# Patient Record
Sex: MALE | Race: OTHER | NOT HISPANIC OR LATINO
[De-identification: names, ages, dates, MRNs, and addresses within clinical notes are randomized per-mention and may not be internally consistent; named-entity substitution may affect disease eponyms.]

---

## 2022-06-20 ENCOUNTER — APPOINTMENT (OUTPATIENT)
Dept: INTERNAL MEDICINE | Facility: CLINIC | Age: 42
End: 2022-06-20
Payer: MEDICARE

## 2022-06-20 VITALS
OXYGEN SATURATION: 98 % | HEART RATE: 88 BPM | BODY MASS INDEX: 28.7 KG/M2 | HEIGHT: 67.5 IN | TEMPERATURE: 98.7 F | SYSTOLIC BLOOD PRESSURE: 128 MMHG | DIASTOLIC BLOOD PRESSURE: 88 MMHG | WEIGHT: 185 LBS

## 2022-06-20 DIAGNOSIS — J06.9 ACUTE UPPER RESPIRATORY INFECTION, UNSPECIFIED: ICD-10-CM

## 2022-06-20 PROCEDURE — 99214 OFFICE O/P EST MOD 30 MIN: CPT | Mod: 25

## 2022-06-20 PROCEDURE — 99406 BEHAV CHNG SMOKING 3-10 MIN: CPT

## 2022-06-20 NOTE — HISTORY OF PRESENT ILLNESS
[FreeTextEntry8] : Patient is 41 year male  with PMH of HTN, Hyperlipidemia, Bipolar disorder , GERD came today with c/o sinus pressure, cough and cold symptoms for about 1 wks\par As per patient he had COVID 19 test done X 3 ( t 2 times home test and 1 PCR) as per patient all of them were negative\par Came today  with c/o persistent cough and nasal congestion with greenish mucase\par Also patient states that lately his BP is uncontrolled, \par Currently on Losartan 50 mg QD \par

## 2022-08-03 ENCOUNTER — APPOINTMENT (OUTPATIENT)
Dept: INTERNAL MEDICINE | Facility: CLINIC | Age: 42
End: 2022-08-03

## 2023-01-15 ENCOUNTER — RX RENEWAL (OUTPATIENT)
Age: 43
End: 2023-01-15

## 2023-06-01 ENCOUNTER — APPOINTMENT (OUTPATIENT)
Dept: INTERNAL MEDICINE | Facility: CLINIC | Age: 43
End: 2023-06-01
Payer: MEDICARE

## 2023-06-01 VITALS
DIASTOLIC BLOOD PRESSURE: 90 MMHG | SYSTOLIC BLOOD PRESSURE: 156 MMHG | OXYGEN SATURATION: 98 % | BODY MASS INDEX: 28.39 KG/M2 | HEIGHT: 67.5 IN | HEART RATE: 101 BPM | WEIGHT: 183 LBS

## 2023-06-01 PROCEDURE — 99214 OFFICE O/P EST MOD 30 MIN: CPT

## 2023-06-01 RX ORDER — FLUTICASONE PROPIONATE 50 UG/1
50 SPRAY, METERED NASAL TWICE DAILY
Qty: 1 | Refills: 1 | Status: DISCONTINUED | COMMUNITY
Start: 2022-06-20 | End: 2023-06-01

## 2023-06-01 RX ORDER — AMOXICILLIN 875 MG/1
875 TABLET, FILM COATED ORAL
Qty: 14 | Refills: 0 | Status: DISCONTINUED | COMMUNITY
Start: 2022-06-20 | End: 2023-06-01

## 2023-06-01 NOTE — PHYSICAL EXAM
[Well-Appearing] : well-appearing [Normal Outer Ear/Nose] : the outer ears and nose were normal in appearance [Normal Oropharynx] : the oropharynx was normal [Normal TMs] : both tympanic membranes were normal [No JVD] : no jugular venous distention [No Lymphadenopathy] : no lymphadenopathy [Supple] : supple [No Respiratory Distress] : no respiratory distress  [No Accessory Muscle Use] : no accessory muscle use [Clear to Auscultation] : lungs were clear to auscultation bilaterally [Normal Rate] : normal rate  [Regular Rhythm] : with a regular rhythm [Normal S1, S2] : normal S1 and S2 [No Carotid Bruits] : no carotid bruits [No Abdominal Bruit] : a ~M bruit was not heard ~T in the abdomen [No Varicosities] : no varicosities [Soft] : abdomen soft [Non Tender] : non-tender [Non-distended] : non-distended [No Joint Swelling] : no joint swelling [No Rash] : no rash [No Skin Lesions] : no skin lesions [Coordination Grossly Intact] : coordination grossly intact [No Focal Deficits] : no focal deficits [Normal Gait] : normal gait [Speech Grossly Normal] : speech grossly normal [Memory Grossly Normal] : memory grossly normal [Alert and Oriented x3] : oriented to person, place, and time [de-identified] : Lower back pain  at tail bone area

## 2023-06-01 NOTE — REVIEW OF SYSTEMS
[Fever] : no fever [Chills] : no chills [Fatigue] : no fatigue [Discharge] : no discharge [Pain] : no pain [Earache] : no earache [Hearing Loss] : no hearing loss [Chest Pain] : no chest pain [Palpitations] : no palpitations [Claudication] : no  leg claudication [Shortness Of Breath] : no shortness of breath [Wheezing] : no wheezing [Cough] : no cough [Abdominal Pain] : no abdominal pain [Nausea] : no nausea [Constipation] : no constipation [Dysuria] : no dysuria [Incontinence] : no incontinence [Hesitancy] : no hesitancy [Joint Pain] : no joint pain [Joint Stiffness] : no joint stiffness [Muscle Pain] : no muscle pain [Itching] : no itching [Headache] : no headache [Dizziness] : no dizziness [Fainting] : no fainting [Suicidal] : not suicidal [Insomnia] : no insomnia [de-identified] : Bipolar disorder

## 2023-06-01 NOTE — HISTORY OF PRESENT ILLNESS
[FreeTextEntry1] : PAtient came for follow up visit [de-identified] : Patient is 42 year male  with PMH of HTN, Bipolar disorder, Hyperlipidemia,  , came today with c/o lower back pain\par Patient presents for follow up for his chronic medical conditions. He reports compliance with all prescribed medical therapy and dietary\par \par

## 2023-08-04 ENCOUNTER — NON-APPOINTMENT (OUTPATIENT)
Age: 43
End: 2023-08-04

## 2023-09-07 ENCOUNTER — APPOINTMENT (OUTPATIENT)
Dept: INTERNAL MEDICINE | Facility: CLINIC | Age: 43
End: 2023-09-07

## 2023-09-20 ENCOUNTER — RX RENEWAL (OUTPATIENT)
Age: 43
End: 2023-09-20

## 2023-11-28 ENCOUNTER — RX RENEWAL (OUTPATIENT)
Age: 43
End: 2023-11-28

## 2023-11-29 ENCOUNTER — NON-APPOINTMENT (OUTPATIENT)
Age: 43
End: 2023-11-29

## 2023-11-29 ENCOUNTER — APPOINTMENT (OUTPATIENT)
Dept: INTERNAL MEDICINE | Facility: CLINIC | Age: 43
End: 2023-11-29
Payer: MEDICARE

## 2023-11-29 VITALS
SYSTOLIC BLOOD PRESSURE: 128 MMHG | WEIGHT: 183 LBS | DIASTOLIC BLOOD PRESSURE: 86 MMHG | HEART RATE: 117 BPM | HEIGHT: 67.5 IN | OXYGEN SATURATION: 98 % | BODY MASS INDEX: 28.39 KG/M2

## 2023-11-29 DIAGNOSIS — M54.50 LOW BACK PAIN, UNSPECIFIED: ICD-10-CM

## 2023-11-29 DIAGNOSIS — Z00.00 ENCOUNTER FOR GENERAL ADULT MEDICAL EXAMINATION W/OUT ABNORMAL FINDINGS: ICD-10-CM

## 2023-11-29 DIAGNOSIS — F17.210 NICOTINE DEPENDENCE, CIGARETTES, UNCOMPLICATED: ICD-10-CM

## 2023-11-29 DIAGNOSIS — R00.2 PALPITATIONS: ICD-10-CM

## 2023-11-29 DIAGNOSIS — J34.89 OTHER SPECIFIED DISORDERS OF NOSE AND NASAL SINUSES: ICD-10-CM

## 2023-11-29 PROCEDURE — 99406 BEHAV CHNG SMOKING 3-10 MIN: CPT

## 2023-11-29 PROCEDURE — G0439: CPT

## 2023-11-29 PROCEDURE — 36415 COLL VENOUS BLD VENIPUNCTURE: CPT

## 2023-11-29 PROCEDURE — 93000 ELECTROCARDIOGRAM COMPLETE: CPT

## 2023-12-06 DIAGNOSIS — E55.9 VITAMIN D DEFICIENCY, UNSPECIFIED: ICD-10-CM

## 2023-12-06 LAB
25(OH)D3 SERPL-MCNC: 17.4 NG/ML
ALBUMIN SERPL ELPH-MCNC: 4.2 G/DL
ALP BLD-CCNC: 109 U/L
ALT SERPL-CCNC: 30 U/L
ANION GAP SERPL CALC-SCNC: 13 MMOL/L
AST SERPL-CCNC: 18 U/L
BILIRUB SERPL-MCNC: 0.2 MG/DL
BUN SERPL-MCNC: 11 MG/DL
CALCIUM SERPL-MCNC: 9.5 MG/DL
CHLORIDE SERPL-SCNC: 102 MMOL/L
CHOLEST SERPL-MCNC: 181 MG/DL
CO2 SERPL-SCNC: 24 MMOL/L
CREAT SERPL-MCNC: 1.07 MG/DL
EGFR: 89 ML/MIN/1.73M2
GLUCOSE SERPL-MCNC: 138 MG/DL
HCT VFR BLD CALC: 46.5 %
HDLC SERPL-MCNC: 29 MG/DL
HGB BLD-MCNC: 15 G/DL
LDLC SERPL CALC-MCNC: 98 MG/DL
MCHC RBC-ENTMCNC: 29.5 PG
MCHC RBC-ENTMCNC: 32.3 GM/DL
MCV RBC AUTO: 91.4 FL
NONHDLC SERPL-MCNC: 153 MG/DL
PLATELET # BLD AUTO: 256 K/UL
POTASSIUM SERPL-SCNC: 4.2 MMOL/L
PROT SERPL-MCNC: 6.9 G/DL
RBC # BLD: 5.09 M/UL
RBC # FLD: 14 %
SODIUM SERPL-SCNC: 139 MMOL/L
TRIGL SERPL-MCNC: 326 MG/DL
TSH SERPL-ACNC: 1.45 UIU/ML
WBC # FLD AUTO: 10.33 K/UL

## 2023-12-22 ENCOUNTER — NON-APPOINTMENT (OUTPATIENT)
Age: 43
End: 2023-12-22

## 2023-12-27 ENCOUNTER — NON-APPOINTMENT (OUTPATIENT)
Age: 43
End: 2023-12-27

## 2024-01-18 ENCOUNTER — RX RENEWAL (OUTPATIENT)
Age: 44
End: 2024-01-18

## 2024-01-18 RX ORDER — FAMOTIDINE 40 MG/1
40 TABLET, FILM COATED ORAL
Qty: 180 | Refills: 1 | Status: ACTIVE | COMMUNITY
Start: 2022-06-20 | End: 1900-01-01

## 2024-05-17 ENCOUNTER — RX RENEWAL (OUTPATIENT)
Age: 44
End: 2024-05-17

## 2024-05-17 RX ORDER — LOSARTAN POTASSIUM 100 MG/1
100 TABLET, FILM COATED ORAL
Qty: 90 | Refills: 0 | Status: ACTIVE | COMMUNITY
Start: 2022-06-20 | End: 1900-01-01

## 2024-05-20 ENCOUNTER — APPOINTMENT (OUTPATIENT)
Dept: INTERNAL MEDICINE | Facility: CLINIC | Age: 44
End: 2024-05-20

## 2024-05-29 ENCOUNTER — RX RENEWAL (OUTPATIENT)
Age: 44
End: 2024-05-29

## 2024-05-29 RX ORDER — SIMVASTATIN 10 MG/1
10 TABLET, FILM COATED ORAL
Qty: 90 | Refills: 1 | Status: ACTIVE | COMMUNITY
Start: 2022-06-20 | End: 1900-01-01

## 2024-05-30 ENCOUNTER — APPOINTMENT (OUTPATIENT)
Dept: INTERNAL MEDICINE | Facility: CLINIC | Age: 44
End: 2024-05-30
Payer: MEDICARE

## 2024-05-30 VITALS
TEMPERATURE: 98 F | OXYGEN SATURATION: 97 % | SYSTOLIC BLOOD PRESSURE: 116 MMHG | BODY MASS INDEX: 28.23 KG/M2 | HEART RATE: 82 BPM | HEIGHT: 67.5 IN | WEIGHT: 182 LBS | DIASTOLIC BLOOD PRESSURE: 73 MMHG

## 2024-05-30 DIAGNOSIS — K21.9 GASTRO-ESOPHAGEAL REFLUX DISEASE W/OUT ESOPHAGITIS: ICD-10-CM

## 2024-05-30 DIAGNOSIS — M54.50 LOW BACK PAIN, UNSPECIFIED: ICD-10-CM

## 2024-05-30 DIAGNOSIS — T14.8XXA OTHER INJURY OF UNSPECIFIED BODY REGION, INITIAL ENCOUNTER: ICD-10-CM

## 2024-05-30 DIAGNOSIS — F31.9 BIPOLAR DISORDER, UNSPECIFIED: ICD-10-CM

## 2024-05-30 DIAGNOSIS — E78.2 MIXED HYPERLIPIDEMIA: ICD-10-CM

## 2024-05-30 DIAGNOSIS — R73.02 IMPAIRED GLUCOSE TOLERANCE (ORAL): ICD-10-CM

## 2024-05-30 DIAGNOSIS — I10 ESSENTIAL (PRIMARY) HYPERTENSION: ICD-10-CM

## 2024-05-30 PROCEDURE — 99214 OFFICE O/P EST MOD 30 MIN: CPT

## 2024-05-30 PROCEDURE — 36415 COLL VENOUS BLD VENIPUNCTURE: CPT

## 2024-05-30 PROCEDURE — G2211 COMPLEX E/M VISIT ADD ON: CPT

## 2024-05-30 RX ORDER — ERGOCALCIFEROL 1.25 MG/1
1.25 MG CAPSULE ORAL
Qty: 16 | Refills: 1 | Status: ACTIVE | COMMUNITY
Start: 2023-12-06 | End: 1900-01-01

## 2024-05-30 NOTE — PHYSICAL EXAM
[Well-Appearing] : well-appearing [Normal Outer Ear/Nose] : the outer ears and nose were normal in appearance [Normal Oropharynx] : the oropharynx was normal [Normal TMs] : both tympanic membranes were normal [No JVD] : no jugular venous distention [No Lymphadenopathy] : no lymphadenopathy [Supple] : supple [No Respiratory Distress] : no respiratory distress  [No Accessory Muscle Use] : no accessory muscle use [Clear to Auscultation] : lungs were clear to auscultation bilaterally [Normal Rate] : normal rate  [Regular Rhythm] : with a regular rhythm [Normal S1, S2] : normal S1 and S2 [No Carotid Bruits] : no carotid bruits [No Abdominal Bruit] : a ~M bruit was not heard ~T in the abdomen [No Varicosities] : no varicosities [Soft] : abdomen soft [Non Tender] : non-tender [Non-distended] : non-distended [No Joint Swelling] : no joint swelling [No Rash] : no rash [No Skin Lesions] : no skin lesions [Coordination Grossly Intact] : coordination grossly intact [No Focal Deficits] : no focal deficits [Normal Gait] : normal gait [Speech Grossly Normal] : speech grossly normal [Memory Grossly Normal] : memory grossly normal [Alert and Oriented x3] : oriented to person, place, and time [de-identified] : Lower back pain  at tail bone area

## 2024-05-30 NOTE — REVIEW OF SYSTEMS
[Fever] : no fever [Chills] : no chills [Fatigue] : no fatigue [Discharge] : no discharge [Pain] : no pain [Earache] : no earache [Hearing Loss] : no hearing loss [Chest Pain] : no chest pain [Palpitations] : no palpitations [Claudication] : no  leg claudication [Shortness Of Breath] : no shortness of breath [Wheezing] : no wheezing [Cough] : no cough [Abdominal Pain] : no abdominal pain [Nausea] : no nausea [Constipation] : no constipation [Dysuria] : no dysuria [Incontinence] : no incontinence [Hesitancy] : no hesitancy [Joint Pain] : no joint pain [Joint Stiffness] : no joint stiffness [Muscle Pain] : no muscle pain [Itching] : no itching [Headache] : no headache [Dizziness] : no dizziness [Fainting] : no fainting [Suicidal] : not suicidal [Insomnia] : no insomnia [de-identified] : Bipolar disorder
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
## 2024-05-31 LAB
ALBUMIN SERPL ELPH-MCNC: 4.3 G/DL
ALP BLD-CCNC: 105 U/L
ALT SERPL-CCNC: 29 U/L
ANION GAP SERPL CALC-SCNC: 13 MMOL/L
AST SERPL-CCNC: 18 U/L
BILIRUB SERPL-MCNC: 0.2 MG/DL
BUN SERPL-MCNC: 14 MG/DL
CALCIUM SERPL-MCNC: 9.6 MG/DL
CHLORIDE SERPL-SCNC: 105 MMOL/L
CHOLEST SERPL-MCNC: 171 MG/DL
CO2 SERPL-SCNC: 20 MMOL/L
CREAT SERPL-MCNC: 0.92 MG/DL
EGFR: 107 ML/MIN/1.73M2
ESTIMATED AVERAGE GLUCOSE: 131 MG/DL
GLUCOSE SERPL-MCNC: 143 MG/DL
HBA1C MFR BLD HPLC: 6.2 %
HCT VFR BLD CALC: 45.6 %
HDLC SERPL-MCNC: 35 MG/DL
HGB BLD-MCNC: 15.2 G/DL
LDLC SERPL CALC-MCNC: 108 MG/DL
MCHC RBC-ENTMCNC: 29.5 PG
MCHC RBC-ENTMCNC: 33.3 GM/DL
MCV RBC AUTO: 88.5 FL
NONHDLC SERPL-MCNC: 136 MG/DL
PLATELET # BLD AUTO: 258 K/UL
POTASSIUM SERPL-SCNC: 4.6 MMOL/L
PROT SERPL-MCNC: 7.2 G/DL
RBC # BLD: 5.15 M/UL
RBC # FLD: 14.1 %
SODIUM SERPL-SCNC: 139 MMOL/L
TRIGL SERPL-MCNC: 156 MG/DL
WBC # FLD AUTO: 10.13 K/UL

## 2024-08-12 ENCOUNTER — RX RENEWAL (OUTPATIENT)
Age: 44
End: 2024-08-12

## 2024-08-30 ENCOUNTER — EMERGENCY (EMERGENCY)
Facility: HOSPITAL | Age: 44
LOS: 1 days | Discharge: HOME | End: 2024-08-30
Attending: STUDENT IN AN ORGANIZED HEALTH CARE EDUCATION/TRAINING PROGRAM
Payer: MEDICARE

## 2024-08-30 VITALS
TEMPERATURE: 98 F | DIASTOLIC BLOOD PRESSURE: 84 MMHG | RESPIRATION RATE: 19 BRPM | HEART RATE: 81 BPM | OXYGEN SATURATION: 96 % | SYSTOLIC BLOOD PRESSURE: 151 MMHG

## 2024-08-30 VITALS
RESPIRATION RATE: 18 BRPM | HEART RATE: 96 BPM | TEMPERATURE: 98 F | SYSTOLIC BLOOD PRESSURE: 143 MMHG | HEIGHT: 67 IN | WEIGHT: 171.96 LBS | DIASTOLIC BLOOD PRESSURE: 89 MMHG | OXYGEN SATURATION: 96 %

## 2024-08-30 LAB
ALBUMIN SERPL ELPH-MCNC: 4.3 G/DL — SIGNIFICANT CHANGE UP (ref 3.3–5)
ALP SERPL-CCNC: 108 U/L — SIGNIFICANT CHANGE UP (ref 40–120)
ALT FLD-CCNC: 18 U/L — SIGNIFICANT CHANGE UP (ref 10–45)
ANION GAP SERPL CALC-SCNC: 13 MMOL/L — SIGNIFICANT CHANGE UP (ref 5–17)
AST SERPL-CCNC: 24 U/L — SIGNIFICANT CHANGE UP (ref 10–40)
BASOPHILS # BLD AUTO: 0.03 K/UL — SIGNIFICANT CHANGE UP (ref 0–0.2)
BASOPHILS NFR BLD AUTO: 0.2 % — SIGNIFICANT CHANGE UP (ref 0–2)
BILIRUB SERPL-MCNC: 0.5 MG/DL — SIGNIFICANT CHANGE UP (ref 0.2–1.2)
BUN SERPL-MCNC: 15 MG/DL — SIGNIFICANT CHANGE UP (ref 7–23)
CALCIUM SERPL-MCNC: 9.8 MG/DL — SIGNIFICANT CHANGE UP (ref 8.4–10.5)
CHLORIDE SERPL-SCNC: 105 MMOL/L — SIGNIFICANT CHANGE UP (ref 96–108)
CO2 SERPL-SCNC: 22 MMOL/L — SIGNIFICANT CHANGE UP (ref 22–31)
CREAT SERPL-MCNC: 0.92 MG/DL — SIGNIFICANT CHANGE UP (ref 0.5–1.3)
D DIMER BLD IA.RAPID-MCNC: 152 NG/ML DDU — SIGNIFICANT CHANGE UP
EGFR: 107 ML/MIN/1.73M2 — SIGNIFICANT CHANGE UP
EOSINOPHIL # BLD AUTO: 0.01 K/UL — SIGNIFICANT CHANGE UP (ref 0–0.5)
EOSINOPHIL NFR BLD AUTO: 0.1 % — SIGNIFICANT CHANGE UP (ref 0–6)
GLUCOSE SERPL-MCNC: 122 MG/DL — HIGH (ref 70–99)
HCT VFR BLD CALC: 43.4 % — SIGNIFICANT CHANGE UP (ref 39–50)
HGB BLD-MCNC: 14.5 G/DL — SIGNIFICANT CHANGE UP (ref 13–17)
IMM GRANULOCYTES NFR BLD AUTO: 0.4 % — SIGNIFICANT CHANGE UP (ref 0–0.9)
LYMPHOCYTES # BLD AUTO: 13.7 % — SIGNIFICANT CHANGE UP (ref 13–44)
LYMPHOCYTES # BLD AUTO: 2.23 K/UL — SIGNIFICANT CHANGE UP (ref 1–3.3)
MCHC RBC-ENTMCNC: 29.9 PG — SIGNIFICANT CHANGE UP (ref 27–34)
MCHC RBC-ENTMCNC: 33.4 GM/DL — SIGNIFICANT CHANGE UP (ref 32–36)
MCV RBC AUTO: 89.5 FL — SIGNIFICANT CHANGE UP (ref 80–100)
MONOCYTES # BLD AUTO: 0.93 K/UL — HIGH (ref 0–0.9)
MONOCYTES NFR BLD AUTO: 5.7 % — SIGNIFICANT CHANGE UP (ref 2–14)
NEUTROPHILS # BLD AUTO: 13.03 K/UL — HIGH (ref 1.8–7.4)
NEUTROPHILS NFR BLD AUTO: 79.9 % — HIGH (ref 43–77)
NRBC # BLD: 0 /100 WBCS — SIGNIFICANT CHANGE UP (ref 0–0)
NT-PROBNP SERPL-SCNC: <36 PG/ML — SIGNIFICANT CHANGE UP (ref 0–300)
PLATELET # BLD AUTO: 251 K/UL — SIGNIFICANT CHANGE UP (ref 150–400)
POTASSIUM SERPL-MCNC: 4 MMOL/L — SIGNIFICANT CHANGE UP (ref 3.5–5.3)
POTASSIUM SERPL-SCNC: 4 MMOL/L — SIGNIFICANT CHANGE UP (ref 3.5–5.3)
PROT SERPL-MCNC: 7.6 G/DL — SIGNIFICANT CHANGE UP (ref 6–8.3)
RBC # BLD: 4.85 M/UL — SIGNIFICANT CHANGE UP (ref 4.2–5.8)
RBC # FLD: 14.4 % — SIGNIFICANT CHANGE UP (ref 10.3–14.5)
SODIUM SERPL-SCNC: 140 MMOL/L — SIGNIFICANT CHANGE UP (ref 135–145)
TROPONIN T, HIGH SENSITIVITY RESULT: <6 NG/L — SIGNIFICANT CHANGE UP (ref 0–51)
WBC # BLD: 16.29 K/UL — HIGH (ref 3.8–10.5)
WBC # FLD AUTO: 16.29 K/UL — HIGH (ref 3.8–10.5)

## 2024-08-30 PROCEDURE — 93005 ELECTROCARDIOGRAM TRACING: CPT

## 2024-08-30 PROCEDURE — 36000 PLACE NEEDLE IN VEIN: CPT

## 2024-08-30 PROCEDURE — 94640 AIRWAY INHALATION TREATMENT: CPT

## 2024-08-30 PROCEDURE — 84484 ASSAY OF TROPONIN QUANT: CPT

## 2024-08-30 PROCEDURE — 83880 ASSAY OF NATRIURETIC PEPTIDE: CPT

## 2024-08-30 PROCEDURE — 85379 FIBRIN DEGRADATION QUANT: CPT

## 2024-08-30 PROCEDURE — 99285 EMERGENCY DEPT VISIT HI MDM: CPT | Mod: FS

## 2024-08-30 PROCEDURE — 71046 X-RAY EXAM CHEST 2 VIEWS: CPT | Mod: 26

## 2024-08-30 PROCEDURE — 71046 X-RAY EXAM CHEST 2 VIEWS: CPT

## 2024-08-30 PROCEDURE — 99285 EMERGENCY DEPT VISIT HI MDM: CPT | Mod: 25

## 2024-08-30 PROCEDURE — 85025 COMPLETE CBC W/AUTO DIFF WBC: CPT

## 2024-08-30 PROCEDURE — 80053 COMPREHEN METABOLIC PANEL: CPT

## 2024-08-30 RX ORDER — IPRATROPIUM BROMIDE AND ALBUTEROL SULFATE .5; 3 MG/3ML; MG/3ML
3 SOLUTION RESPIRATORY (INHALATION) ONCE
Refills: 0 | Status: COMPLETED | OUTPATIENT
Start: 2024-08-30 | End: 2024-08-30

## 2024-08-30 RX ADMIN — IPRATROPIUM BROMIDE AND ALBUTEROL SULFATE 3 MILLILITER(S): .5; 3 SOLUTION RESPIRATORY (INHALATION) at 09:58

## 2024-08-30 NOTE — ED PROVIDER NOTE - NSFOLLOWUPCLINICS_GEN_ALL_ED_FT
[facility] Pulmonolgy and Sleep Medicine  Pulmonology  [address] [place] [address], [place] [address]  Phone: [phone]  Fax:

## 2024-08-30 NOTE — ED PROVIDER NOTE - OBJECTIVE STATEMENT
43-year-old male history bipolar disorder not currently on medications, daily smoker presents with worsening cough that has been present for over 1 month worsening last 2 weeks with reported chest pressure on "both sides of my lungs" worse after smoking, worse with deep inspiration.  Denies exertional chest pain, radiating chest pain or associated nausea/vomiting/diaphoresis.  Denies extremity swelling.  Denies reported fever, chills, hemoptysis, abdominal pain, vomiting

## 2024-08-30 NOTE — ED PROVIDER NOTE - NSFOLLOWUPINSTRUCTIONS_ED_ALL_ED_FT
Please follow up with your primary care doctor in 1-3 days.  Follow up with a cardiologist - see contact info     *Bring all printed lab/test results to your appointment(s).*    We recommend quitting smoking - read printed material on smoking cessation.    Daily humidifier treatments and oral hydration will help expectorate (bring up) mucous.  Nebulizer treatments as needed for cough/wheezing - (Please read all medication information/instructions).     Return for worsening shortness of breath, chest pain, dizziness, loss of consciousness, or any other concerns. Please follow up with your primary care doctor in 1-3 days.  Follow up with a pulmonologist - see contact info.    *Bring all printed lab/test results to your appointment(s).*    We recommend quitting smoking - read printed material on smoking cessation.    Daily humidifier treatments and oral hydration will help expectorate (bring up) mucous.  Albuterol inhaler as needed for cough/wheezing - (Please read all medication information/instructions).   Please discuss with your PMD before taking any cold/flu medications that may increase your blood pressure while smoking.    Return for worsening shortness of breath, chest pain, dizziness, loss of consciousness, fever >100.4F, chills, dark/bloody sputum when coughing, or any other concerns.

## 2024-08-30 NOTE — ED ADULT NURSE NOTE - NSFALLUNIVINTERV_ED_ALL_ED
Bed/Stretcher in lowest position, wheels locked, appropriate side rails in place/Call bell, personal items and telephone in reach/Instruct patient to call for assistance before getting out of bed/chair/stretcher/Non-slip footwear applied when patient is off stretcher/[place] to call system/Physically safe environment - no spills, clutter or unnecessary equipment/Purposeful proactive rounding/Room/bathroom lighting operational, light cord in reach

## 2024-08-30 NOTE — ED PROVIDER NOTE - MUSCULOSKELETAL, MLM
Spine appears normal, range of motion is not limited, no muscle or joint tenderness; no extremity swelling

## 2024-08-30 NOTE — ED PROVIDER NOTE - ATTENDING APP SHARED VISIT CONTRIBUTION OF CARE
Attending Zohreh: I performed a history and physical exam of the patient and discussed their management with the STEVIE. I have reviewed the STEVIE note and agree with the documented findings and plan of care, except as noted. This was a shared visit with an STEVIE. I reviewed and verified the documentation and independently performed my own history/exam/medical decision making. My medical decision making and observations are found below. Please refer to any progress notes for updates on clinical course. My notes supersedes the above STEVIE note in case of discrepancy    MDM:  42 y/o M w/ PMH o bipolar disorder not currently on medications, daily smoker presents w/ cough. Reports worsening cough that has been present for over 1 month, worsening last 2 weeks with reported chest pressure on "both sides of my lungs" worse after smoking, worse with deep inspiration.  Denies exertional chest pain, radiating chest pain or associated nausea/vomiting/diaphoresis.  Denies extremity swelling.  Denies prior PE/DVT. Denies reported fever, chills, hemoptysis, abdominal pain, vomiting.    Gen: NAD, AOx3, able to make needs known, non-toxic  Head: NCAT  HEENT: EOMI, oral mucosa moist, normal conjunctiva  Lung: no respiratory distress, CTAB, no wheezes/rhonchi/rales B/L, speaking in full sentences  CV: Tachycardic, no murmurs  Abd: non distended, soft, nontender, no guarding, no CVA tenderness  MSK: no visible deformities  Neuro: Appears non focal  Skin: Warm, well perfused  Psych: normal affect     Pt overall no acute distress. Will eval for PNA. Do not suspect PTX. Screen for PE w/ d-dimer. Doubtful ACS/CHF. Unlikely Asthma/COPD given no prior dx of and no wheezing. Will treat symptomatically. Plan for labs, imaging, EKG, meds. Will reassess the need for additional interventions as clinically warranted. Refer to any progress notes for updates on clinical course and as a continuation of this MDM.     I, Dr. Virgil Bruner, independently interpreted the EKG which showed sinus tachycardia at a rate of 101.

## 2024-08-30 NOTE — ED PROVIDER NOTE - PROGRESS NOTE DETAILS
Lab work revealing for leukocytosis of 16, remaining labs nonactionable.  Two-view chest x-ray with negative for acute infectious infiltrate.  Discussed all results with patient, reassessed reports feeling improved lungs clear without wheezing rales or rhonchi.  Will treat for bronchitis with albuterol inhaler given patient is daily smoker.  Struck the patient on smoking cessation. Will dc with follow up. Discussed plan and return precautions with patient who understands and agrees. All questions answered. - Rojas Ma PA-C Lab work revealing for leukocytosis of 16, remaining labs nonactionable.  Two-view chest x-ray with negative for acute infectious infiltrate.  Discussed all results with patient, reassessed reports feeling improved lungs clear without wheezing rales or rhonchi.  Will treat for bronchitis with albuterol inhaler given patient is daily smoker.  Instructed the patient on smoking cessation. Will dc with follow up. Discussed plan and return precautions with patient who understands and agrees. All questions answered. - Rojas Ma PA-C

## 2024-08-30 NOTE — ED PROVIDER NOTE - CLINICAL SUMMARY MEDICAL DECISION MAKING FREE TEXT BOX
Female dilution smoker presents with several weeks of cough worsening in the last week with associated chest pressure worse when smoking and with deep inspiration.  No hemoptysis or systemic symptoms.  No recent travel.  No extremity swelling.  Initial set of vitals mildly tachycardic in the low 100s otherwise nonactionable.  On exam mildly tachycardic with clear lungs differentials acute bronchitis, pneumonia, ACS, PE, pleurisy.  Will obtain 2 view chest x-ray, send labs including troponin and D-dimer to screen for PE given tachycardia and smoker with pleuritic chest pain, nebs, reassess - Rojas Ma PA-C 44 y/o M smoker presents with several weeks of cough worsening in the last week with associated chest pressure worse when smoking and with deep inspiration.  No hemoptysis or systemic symptoms.  No recent travel.  No extremity swelling.  Initial set of vitals mildly tachycardic in the low 100s otherwise nonactionable.  On exam mildly tachycardic with clear lungs differentials acute bronchitis, pneumonia, ACS, PE, pleurisy.  Will obtain 2 view chest x-ray, send labs including troponin and D-dimer to screen for PE given tachycardia and smoker with pleuritic chest pain, nebs, reassess - Rojas Ma PA-C    Attending Zohreh: See attending attestation.

## 2024-08-30 NOTE — ED ADULT NURSE NOTE - OBJECTIVE STATEMENT
patient received alert & oriented x4, ambulatory. Complaining of chest heaviness for 2 weeks & increasing for the past 5-6 days. (+) dry cough. Admitted "I am a chain smoker for 27 years".  Denies sob, nausea or fever. Hx HTN, high cholesterol, Acid reflux, on losartan, simvastatin & pepcid & bipolar (no meds).

## 2024-08-30 NOTE — ED PROVIDER NOTE - PATIENT PORTAL LINK FT
You can access the FollowMyHealth Patient Portal offered by [facility] by registering at the following website: [email or web address]. By joining [facility]’s FollowMyHealth portal, you will also be able to view your health information using other applications (apps) compatible with our system.

## 2024-09-05 ENCOUNTER — APPOINTMENT (OUTPATIENT)
Dept: INTERNAL MEDICINE | Facility: CLINIC | Age: 44
End: 2024-09-05

## 2024-09-19 ENCOUNTER — APPOINTMENT (OUTPATIENT)
Dept: INTERNAL MEDICINE | Facility: CLINIC | Age: 44
End: 2024-09-19

## 2024-09-23 ENCOUNTER — APPOINTMENT (OUTPATIENT)
Dept: INTERNAL MEDICINE | Facility: CLINIC | Age: 44
End: 2024-09-23

## 2024-09-24 ENCOUNTER — EMERGENCY (EMERGENCY)
Facility: HOSPITAL | Age: 44
LOS: 1 days | Discharge: PSYCHIATRIC OR SUBSTANCE USE FACILITY | End: 2024-09-24
Attending: STUDENT IN AN ORGANIZED HEALTH CARE EDUCATION/TRAINING PROGRAM
Payer: MEDICARE

## 2024-09-24 VITALS
RESPIRATION RATE: 18 BRPM | HEIGHT: 67 IN | SYSTOLIC BLOOD PRESSURE: 152 MMHG | DIASTOLIC BLOOD PRESSURE: 99 MMHG | WEIGHT: 169.98 LBS | HEART RATE: 123 BPM | OXYGEN SATURATION: 96 %

## 2024-09-24 LAB
ALBUMIN SERPL ELPH-MCNC: 4.2 G/DL — SIGNIFICANT CHANGE UP (ref 3.3–5)
ALP SERPL-CCNC: 122 U/L — HIGH (ref 40–120)
ALT FLD-CCNC: 21 U/L — SIGNIFICANT CHANGE UP (ref 10–45)
ANION GAP SERPL CALC-SCNC: 15 MMOL/L — SIGNIFICANT CHANGE UP (ref 5–17)
APAP SERPL-MCNC: <15 UG/ML — SIGNIFICANT CHANGE UP (ref 10–30)
AST SERPL-CCNC: 21 U/L — SIGNIFICANT CHANGE UP (ref 10–40)
BASOPHILS # BLD AUTO: 0.02 K/UL — SIGNIFICANT CHANGE UP (ref 0–0.2)
BASOPHILS NFR BLD AUTO: 0.1 % — SIGNIFICANT CHANGE UP (ref 0–2)
BILIRUB SERPL-MCNC: 0.4 MG/DL — SIGNIFICANT CHANGE UP (ref 0.2–1.2)
BUN SERPL-MCNC: 11 MG/DL — SIGNIFICANT CHANGE UP (ref 7–23)
CALCIUM SERPL-MCNC: 10 MG/DL — SIGNIFICANT CHANGE UP (ref 8.4–10.5)
CHLORIDE SERPL-SCNC: 101 MMOL/L — SIGNIFICANT CHANGE UP (ref 96–108)
CO2 SERPL-SCNC: 21 MMOL/L — LOW (ref 22–31)
CREAT SERPL-MCNC: 0.96 MG/DL — SIGNIFICANT CHANGE UP (ref 0.5–1.3)
EGFR: 101 ML/MIN/1.73M2 — SIGNIFICANT CHANGE UP
EOSINOPHIL # BLD AUTO: 0.06 K/UL — SIGNIFICANT CHANGE UP (ref 0–0.5)
EOSINOPHIL NFR BLD AUTO: 0.4 % — SIGNIFICANT CHANGE UP (ref 0–6)
ETHANOL SERPL-MCNC: <10 MG/DL — SIGNIFICANT CHANGE UP (ref 0–10)
FLUAV AG NPH QL: SIGNIFICANT CHANGE UP
FLUBV AG NPH QL: SIGNIFICANT CHANGE UP
GLUCOSE SERPL-MCNC: 127 MG/DL — HIGH (ref 70–99)
HCT VFR BLD CALC: 46.1 % — SIGNIFICANT CHANGE UP (ref 39–50)
HGB BLD-MCNC: 15.4 G/DL — SIGNIFICANT CHANGE UP (ref 13–17)
IMM GRANULOCYTES NFR BLD AUTO: 0.4 % — SIGNIFICANT CHANGE UP (ref 0–0.9)
LITHIUM SERPL-MCNC: <.2 MMOL/L — LOW (ref 0.6–1.2)
LYMPHOCYTES # BLD AUTO: 16.5 % — SIGNIFICANT CHANGE UP (ref 13–44)
LYMPHOCYTES # BLD AUTO: 2.29 K/UL — SIGNIFICANT CHANGE UP (ref 1–3.3)
MCHC RBC-ENTMCNC: 29.6 PG — SIGNIFICANT CHANGE UP (ref 27–34)
MCHC RBC-ENTMCNC: 33.4 GM/DL — SIGNIFICANT CHANGE UP (ref 32–36)
MCV RBC AUTO: 88.5 FL — SIGNIFICANT CHANGE UP (ref 80–100)
MONOCYTES # BLD AUTO: 0.72 K/UL — SIGNIFICANT CHANGE UP (ref 0–0.9)
MONOCYTES NFR BLD AUTO: 5.2 % — SIGNIFICANT CHANGE UP (ref 2–14)
NEUTROPHILS # BLD AUTO: 10.73 K/UL — HIGH (ref 1.8–7.4)
NEUTROPHILS NFR BLD AUTO: 77.4 % — HIGH (ref 43–77)
NRBC # BLD: 0 /100 WBCS — SIGNIFICANT CHANGE UP (ref 0–0)
PLATELET # BLD AUTO: 281 K/UL — SIGNIFICANT CHANGE UP (ref 150–400)
POTASSIUM SERPL-MCNC: 4.2 MMOL/L — SIGNIFICANT CHANGE UP (ref 3.5–5.3)
POTASSIUM SERPL-SCNC: 4.2 MMOL/L — SIGNIFICANT CHANGE UP (ref 3.5–5.3)
PROT SERPL-MCNC: 7.8 G/DL — SIGNIFICANT CHANGE UP (ref 6–8.3)
RBC # BLD: 5.21 M/UL — SIGNIFICANT CHANGE UP (ref 4.2–5.8)
RBC # FLD: 13.6 % — SIGNIFICANT CHANGE UP (ref 10.3–14.5)
RSV RNA NPH QL NAA+NON-PROBE: SIGNIFICANT CHANGE UP
SALICYLATES SERPL-MCNC: <2 MG/DL — LOW (ref 15–30)
SARS-COV-2 RNA SPEC QL NAA+PROBE: SIGNIFICANT CHANGE UP
SODIUM SERPL-SCNC: 137 MMOL/L — SIGNIFICANT CHANGE UP (ref 135–145)
WBC # BLD: 13.88 K/UL — HIGH (ref 3.8–10.5)
WBC # FLD AUTO: 13.88 K/UL — HIGH (ref 3.8–10.5)

## 2024-09-24 PROCEDURE — 99285 EMERGENCY DEPT VISIT HI MDM: CPT | Mod: GC

## 2024-09-24 PROCEDURE — 71045 X-RAY EXAM CHEST 1 VIEW: CPT | Mod: 26

## 2024-09-24 RX ORDER — ONDANSETRON 2 MG/ML
4 INJECTION, SOLUTION INTRAMUSCULAR; INTRAVENOUS ONCE
Refills: 0 | Status: COMPLETED | OUTPATIENT
Start: 2024-09-24 | End: 2024-09-24

## 2024-09-24 RX ORDER — LORAZEPAM 4 MG/ML
1 INJECTION INTRAMUSCULAR; INTRAVENOUS ONCE
Refills: 0 | Status: DISCONTINUED | OUTPATIENT
Start: 2024-09-24 | End: 2024-09-24

## 2024-09-24 RX ADMIN — ONDANSETRON 4 MILLIGRAM(S): 2 INJECTION, SOLUTION INTRAMUSCULAR; INTRAVENOUS at 21:26

## 2024-09-24 RX ADMIN — Medication 1 PATCH: at 21:28

## 2024-09-24 RX ADMIN — LORAZEPAM 1 MILLIGRAM(S): 4 INJECTION INTRAMUSCULAR; INTRAVENOUS at 21:26

## 2024-09-24 NOTE — ED PROVIDER NOTE - CARE PLAN
Principal Discharge DX:	Bipolar affective psychosis  Secondary Diagnosis:	Status post tooth extraction  Secondary Diagnosis:	Maxillary fracture   1

## 2024-09-24 NOTE — ED PROVIDER NOTE - NSRISKSEXPLAINEDTO_ED_A_ED
Telephone Encounter by Tracey Martinez at 05/15/18 03:03 PM     Author:  Tracey Martinez Service:  (none) Author Type:  Patient      Filed:  05/15/18 03:06 PM Encounter Date:  5/15/2018 Status:  Signed     :  Tracey Martinez (Patient )              TOYA MOREL    Patient Age: 56 year old   Refill request by:[SA1.1T] Phone.  Caller informed to check with the pharmacy later for their refill.  If problems arise, we will contact patient.[SA1.1M]  Refill to be:[SA1.1T] Phoned to[SA1.1M]   Pharmacy     [address] [id number]    Phone: [phone][SA1.2T]          Medication requested to be refilled:[SA1.1T]   Requested Prescriptions     Pending Prescriptions Disp Refills   • lisinopril (PRINIVIL,ZESTRIL) 40 MG tablet 90 Tab 0     Sig: TAKE 1 TAB BY MOUTH DAILY.   • Triamterene-HCTZ (MAXZIDE-25) 37.5-25 MG per tablet 90 Tab 0     Sig: Take 1 Tab by mouth daily.   • amlodipine (NORVASC) 5 MG tablet 90 Tab 0     Sig: Take 1 Tab by mouth daily.   • fenofibrate (TRICOR) 145 MG tablet 90 Tab 0     Sig: TAKE 1 TAB BY MOUTH DAILY.   • topiramate (TOPAMAX) 100 MG tablet 90 Tab 0     Sig: Take 1 Tab by mouth nightly.   • omeprazole (PRILOSEC) 20 MG Cap 90 Cap 0     Sig: Take 1 Cap by mouth daily.   • albuterol (ACCUNEB) 1.25 MG/3ML nebulizer solution 75 mL 0     Sig: Inhale 3 mL by mouth every 4 (four) hours as needed for Wheezing.[SA1.2T]   PER PATIENT.[SA1.1M] Message confirmed with caller.          Next and Last Visit with Provider and Department  Next visit with NIKO GRIFFIN is on No match found  Next visit with [facility] is on No match found   Last visit with NIKO GRIFFIN was on 02/16/2017 at  4:40 PM in [facility]  Last visit with [facility] was on 03/20/2018 at 10:00 AM in [facility]      WEIGHT AND HEIGHT: As of 04/03/2018 weight is 257 lbs.(116.574 kg). Height is 6'  3\"(1.905 m).   BMI is 32.12 kg/(m^2) calculated from:     Height 6' 3\" (1.905 m) as of 4/3/18     Weight 257 lb (116.574 kg) as of 4/3/18[SA1.1T]      Allergies      Allergen   Reactions   • Lipitor [Atorvastatin Calcium]  Other - See Comments     Muscle cramping[SA1.2T]      Current outpatient prescriptions       Medication  Sig Dispense Refill   • hydrocortisone (ANUSOL-HC) 2.5 % rectal cream Apply twice daily, no more than 7 days. 30 g 0   • [place]el PADS Use after each bowel movement or up to 6 times per day. 30 Each 0   • albuterol (ACCUNEB) 1.25 MG/3ML nebulizer solution Inhale 3 mL by mouth every 4 (four) hours as needed for Wheezing. 75 mL 0   • lisinopril (PRINIVIL,ZESTRIL) 40 MG tablet TAKE 1 TAB BY MOUTH DAILY. 90 Tab 0   • Triamterene-HCTZ (MAXZIDE-25) 37.5-25 MG per tablet Take 1 Tab by mouth daily. 90 Tab 0   • amlodipine (NORVASC) 5 MG tablet Take 1 Tab by mouth daily. 90 Tab 0   • fenofibrate (TRICOR) 145 MG tablet TAKE 1 TAB BY MOUTH DAILY. 90 Tab 0   • topiramate (TOPAMAX) 100 MG tablet Take 1 Tab by mouth nightly. 90 Tab 0   • omeprazole (PRILOSEC) 20 MG Cap Take 1 Cap by mouth daily. 90 Cap 0        ROUTING:[SA1.1T] Patient's physician/staff[SA1.1M]        PCP: Kel Padilla MD         INS: Payor: [employer] / Plan: *No Plan* / Product Type: *No Product type* / Note: This is the primary coverage, but no account was found for this location or the patient's primary location.   ADDRESS:  [address] [place] [id number][SA1.1T]       Revision History        User Key Date/Time User Provider Type Action    > SA1.2 05/15/18 03:06 PM Tracey Martinez Patient  Sign     SA1.1 05/15/18 03:03 PM Tracey Martinez Patient      M - Manual, T - Template             Patient

## 2024-09-24 NOTE — ED ADULT NURSE NOTE - REASON FOR REFERRAL
bib ems for psych eval/s/i Xeljanz Counseling: I discussed with the patient the risks of Xeljanz therapy including increased risk of infection, liver issues, headache, diarrhea, or cold symptoms. Live vaccines should be avoided. They were instructed to call if they have any problems.
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
## 2024-09-24 NOTE — ED ADULT NURSE NOTE - DOES PATIENT HAVE ADVANCE DIRECTIVE
YARA TORIBIO    Patient Age: 77 year old   Interpreting service used: No    Insurance on file confirmed with caller: Yes    Patient seen within 1 year by a provider in primary care? Yes-      Refill to be: ePrescribed    Medication requested to be refilled: See Pended Medication    Addition Information:     Does patient have enough to medication for 72 business hours? Yes-  Route message to providers clinical pool.    Caller informed to check with the pharmacy later for their refill.  If problems arise, we will contact patient.    Message read back to caller for accuracy: Yes     WEIGHT AND HEIGHT:   Wt Readings from Last 1 Encounters:   11/30/22 71.7 kg (158 lb)     Ht Readings from Last 1 Encounters:   11/30/22 5' 4\" (1.626 m)     BMI Readings from Last 1 Encounters:   11/30/22 27.12 kg/m²       ALLERGIES:  Patient has no known allergies.  Current Outpatient Medications   Medication Sig Dispense Refill   • carvedilol (COREG) 3.125 MG tablet TAKE 1 TABLET TWICE DAILY WITH MEALS 180 tablet 0   • benazepril (LOTENSIN) 40 MG tablet TAKE 1/2 TABLET EVERY DAY 45 tablet 0   • hydroCHLOROthiazide (HYDRODIURIL) 25 MG tablet TAKE 1 TABLET EVERY DAY 90 tablet 0   • pravastatin (PRAVACHOL) 40 MG tablet TAKE 1 TABLET EVERY DAY 90 tablet 1   • pantoprazole (PROTONIX) 40 MG tablet TAKE 1 TABLET EVERY DAY 90 tablet 1   • alendronate (FOSAMAX) 70 MG tablet TAKE 1 TABLET EVERY 7 DAYS 12 tablet 3   • budesonide-formoterol (SYMBICORT) 160-4.5 MCG/ACT inhaler Inhale 2 puffs into the lungs 2 times daily. 10.2 g 3   • fluticasone (FLONASE) 50 MCG/ACT nasal spray Spray 2 sprays in each nostril daily. 16 g 5   • aspirin (ECOTRIN) 81 MG EC tablet Take 81 mg by mouth daily.     • amoxicillin (AMOXIL) 500 MG capsule Take 4 capsules 1 hour prior to dental procedure 4 capsule 0   • albuterol 108 (90 Base) MCG/ACT inhaler Inhale 2 puffs into the lungs every 4 hours as needed for Shortness of Breath or Wheezing. 1 Inhaler 5     No current  facility-administered medications for this visit.         CALL BACK INFO: Ok to leave response (including medical information) with family member or on answering machine        PCP: Colton Del Rio MD         INS: Payor: [employer]AGE / Plan: [facility] [id number] / Product Type:  MEDICARE ADVANTAGE   PATIENT ADDRESS:  [address] [place] [id number]     No
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
## 2024-09-24 NOTE — ED PROVIDER NOTE - ATTENDING CONTRIBUTION TO CARE
Attending Zohreh: I performed a history and physical exam of the patient and discussed their management with the resident/fellow/student. I have reviewed the resident/fellow/student note and agree with the documented findings and plan of care, except as noted. I have personally performed a substantive portion of the visit including all aspects of the medical decision making. My medical decision making and observations are found below. Please refer to any progress notes for updates on clinical course. My notes supersedes the above resident/fellow/student note in case of discrepancy    MDM:  44 y/o M w/ PMH of schizoaffective disorder on lithium and olanzapine presenting today after syncopal episode and fall on face.  Found by mother at home brought in by EMS. Mother at bedside to provide collateral. Patient reports that everybody wants him to die and he wants to die however does not have a specific plan that he is mentioning.  He mentions that he passed out today which is what caused him to fall and hit his face.  No chest pain or shortness of breath or abdominal pain at this time. Has had bleeding from his mouth since    Gen: A&O x3  Head: NCAT  HEENT: EOMI, oral mucosa moist, normal conjunctiva. blood in mouth, teeth 7,8,9 missing  Lung: no respiratory distress, CTAB, no wheezes/rhonchi/rales B/L, speaking in full sentences  CV: RRR, no murmurs  Abd: non distended, soft, nontender, no guarding, no CVA tenderness  MSK: no visible deformities  Neuro: Appears non focal  Skin: Warm, well perfused  Psych: cooperative w/ exam    Pt w/ concern for psychotic episode as well as s/p fall w/ facial trauma. Will obtain imaging to eval for traumatic injuries. Will likely need dental eval. Will obtain psych screening labs.  Eval for cardiac cause of syncope w/ EKG. Discuss w/ mother privately for additional information. Plan for labs, imaging, EKG, meds PRN. Will reassess the need for additional interventions as clinically warranted. Refer to any progress notes for updates on clinical course and as a continuation of this MDM.     I, Dr. Virgil Bruner, independently interpreted the EKG which showed NSR at a rate of 82.

## 2024-09-24 NOTE — ED ADULT NURSE REASSESSMENT NOTE - NS ED NURSE REASSESS COMMENT FT1
while pt. was in Cat Scan, he started vomiting blood steaked vomitus and was restless and agitated, attempted to climb out of the stretcher, was wheeled back to CC 26 and was medicated w/ zofran 4mg IV and ativan 1mg IV stat. and labs drawan, sent to lab. 1:1 CO for s/i, psych eval maintained. will continue to monitor for further episodes of agitation.

## 2024-09-24 NOTE — ED ADULT NURSE NOTE - PLAN OF CARE
Explanation of exam/test Doxycycline Pregnancy And Lactation Text: This medication is Pregnancy Category D and not consider safe during pregnancy. It is also excreted in breast milk but is considered safe for shorter treatment courses.

## 2024-09-24 NOTE — ED PROVIDER NOTE - CLINICAL SUMMARY MEDICAL DECISION MAKING FREE TEXT BOX
Waqas Fabian, PGY3 - This is a 43-year-old male with past medical history of schizoaffective disorder on lithium and olanzapine per patient lithium for 50 mg at night and 10 of olanzapine at night presenting today after syncopal episode and fall down on the face.  Found by mother at home brought in by EMS.  Patient reports that everybody wants him to die and he wants to die however does not have a specific plan that he is mentioning.  He mentions that he passed out today.  No chest pain or shortness of breath or abdominal pain at this time.  Vital signs show tachycardia to 120s otherwise within normal limits.  Physical exam shows slightly agitated and moving all extremities.  Intermittent selective not responding to questions however arousable and answering questions appropriately otherwise.  Moving all extremities.  Pupils equal and reactive.  There is dried blood in the right forehead.  Slight abrasion.  Dried blood in the mouth.  No other chest wall tenderness to palpation around abdominal tenderness to palpation.  Will workup for syncope with troponin.  Will obtain psych labs including lithium level.  Will keep obtain CT head to rule out any intracranial bleeding and also max face to eval for fractures.  Will obtain chest x-ray for this also.  Will need psych consult after medical clearance for suicidal ideation. Waqas Fabian, PGY3 - This is a 43-year-old male with past medical history of schizoaffective disorder on lithium and olanzapine per patient lithium for 50 mg at night and 10 of olanzapine at night presenting today after syncopal episode and fall down on the face.  Found by mother at home brought in by EMS.  Patient reports that everybody wants him to die and he wants to die however does not have a specific plan that he is mentioning.  He mentions that he passed out today.  No chest pain or shortness of breath or abdominal pain at this time.  Vital signs show tachycardia to 120s otherwise within normal limits.  Physical exam shows slightly agitated and moving all extremities.  Intermittent selective not responding to questions however arousable and answering questions appropriately otherwise.  Moving all extremities.  Pupils equal and reactive.  There is dried blood in the right forehead.  Slight abrasion.  Dried blood in the mouth.  No other chest wall tenderness to palpation around abdominal tenderness to palpation.  Will workup for syncope with troponin.  Will obtain psych labs including lithium level.  Will keep obtain CT head to rule out any intracranial bleeding and also max face to eval for fractures.  Will obtain chest x-ray for this also.  Will need psych consult after medical clearance for suicidal ideation.    Attending Zohreh: See attending attestation.
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
## 2024-09-25 DIAGNOSIS — F31.64 BIPOLAR DISORDER, CURRENT EPISODE MIXED, SEVERE, WITH PSYCHOTIC FEATURES: ICD-10-CM

## 2024-09-25 LAB
AMPHET UR-MCNC: NEGATIVE — SIGNIFICANT CHANGE UP
APPEARANCE UR: CLEAR — SIGNIFICANT CHANGE UP
BARBITURATES UR SCN-MCNC: NEGATIVE — SIGNIFICANT CHANGE UP
BENZODIAZ UR-MCNC: NEGATIVE — SIGNIFICANT CHANGE UP
BILIRUB UR-MCNC: NEGATIVE — SIGNIFICANT CHANGE UP
COCAINE METAB.OTHER UR-MCNC: NEGATIVE — SIGNIFICANT CHANGE UP
COLOR SPEC: YELLOW — SIGNIFICANT CHANGE UP
DIFF PNL FLD: NEGATIVE — SIGNIFICANT CHANGE UP
GLUCOSE UR QL: NEGATIVE MG/DL — SIGNIFICANT CHANGE UP
KETONES UR-MCNC: 15 MG/DL
LEUKOCYTE ESTERASE UR-ACNC: NEGATIVE — SIGNIFICANT CHANGE UP
METHADONE UR-MCNC: NEGATIVE — SIGNIFICANT CHANGE UP
NITRITE UR-MCNC: NEGATIVE — SIGNIFICANT CHANGE UP
OPIATES UR-MCNC: NEGATIVE — SIGNIFICANT CHANGE UP
OXYCODONE UR-MCNC: NEGATIVE — SIGNIFICANT CHANGE UP
PCP SPEC-MCNC: SIGNIFICANT CHANGE UP
PCP UR-MCNC: NEGATIVE — SIGNIFICANT CHANGE UP
PH UR: 6.5 — SIGNIFICANT CHANGE UP (ref 5–8)
PROT UR-MCNC: NEGATIVE MG/DL — SIGNIFICANT CHANGE UP
SP GR SPEC: 1.01 — SIGNIFICANT CHANGE UP (ref 1–1.03)
THC UR QL: POSITIVE
TSH SERPL-MCNC: 0.5 UIU/ML — SIGNIFICANT CHANGE UP (ref 0.27–4.2)
UROBILINOGEN FLD QL: 1 MG/DL — SIGNIFICANT CHANGE UP (ref 0.2–1)

## 2024-09-25 PROCEDURE — 70450 CT HEAD/BRAIN W/O DYE: CPT | Mod: 26,MC

## 2024-09-25 PROCEDURE — 70486 CT MAXILLOFACIAL W/O DYE: CPT | Mod: 26,MC

## 2024-09-25 PROCEDURE — 76377 3D RENDER W/INTRP POSTPROCES: CPT | Mod: 26

## 2024-09-25 PROCEDURE — 99205 OFFICE O/P NEW HI 60 MIN: CPT | Mod: GC

## 2024-09-25 RX ORDER — LITHIUM CARBONATE 150 MG/1
450 CAPSULE ORAL ONCE
Refills: 0 | Status: COMPLETED | OUTPATIENT
Start: 2024-09-25 | End: 2024-09-25

## 2024-09-25 RX ORDER — AMOXICILLIN 500 MG
875 CAPSULE ORAL
Refills: 0 | Status: ACTIVE | OUTPATIENT
Start: 2024-09-25 | End: 2024-10-02

## 2024-09-25 RX ORDER — AMOXICILLIN 500 MG
875 CAPSULE ORAL ONCE
Refills: 0 | Status: COMPLETED | OUTPATIENT
Start: 2024-09-25 | End: 2024-09-25

## 2024-09-25 RX ORDER — KETOROLAC TROMETHAMINE 30 MG/ML
15 INJECTION, SOLUTION INTRAMUSCULAR ONCE
Refills: 0 | Status: DISCONTINUED | OUTPATIENT
Start: 2024-09-25 | End: 2024-09-25

## 2024-09-25 RX ORDER — OLANZAPINE 7.5 MG/1
5 TABLET ORAL ONCE
Refills: 0 | Status: COMPLETED | OUTPATIENT
Start: 2024-09-25 | End: 2024-09-25

## 2024-09-25 RX ORDER — ACETAMINOPHEN 325 MG/1
650 TABLET ORAL EVERY 6 HOURS
Refills: 0 | Status: ACTIVE | OUTPATIENT
Start: 2024-09-25 | End: 2025-08-24

## 2024-09-25 RX ORDER — FAMOTIDINE 10 MG/ML
20 INJECTION INTRAVENOUS
Refills: 0 | Status: ACTIVE | OUTPATIENT
Start: 2024-09-25 | End: 2025-08-24

## 2024-09-25 RX ORDER — OLANZAPINE 7.5 MG/1
5 TABLET ORAL DAILY
Refills: 0 | Status: DISCONTINUED | OUTPATIENT
Start: 2024-09-26 | End: 2024-09-25

## 2024-09-25 RX ORDER — LITHIUM CARBONATE 150 MG/1
300 CAPSULE ORAL
Refills: 0 | Status: DISCONTINUED | OUTPATIENT
Start: 2024-09-25 | End: 2024-09-25

## 2024-09-25 RX ORDER — AMOXICILLIN 500 MG
500 CAPSULE ORAL ONCE
Refills: 0 | Status: COMPLETED | OUTPATIENT
Start: 2024-09-25 | End: 2024-09-25

## 2024-09-25 RX ADMIN — FAMOTIDINE 20 MILLIGRAM(S): 10 INJECTION INTRAVENOUS at 16:46

## 2024-09-25 RX ADMIN — KETOROLAC TROMETHAMINE 15 MILLIGRAM(S): 30 INJECTION, SOLUTION INTRAMUSCULAR at 03:38

## 2024-09-25 RX ADMIN — Medication 500 MILLIGRAM(S): at 16:47

## 2024-09-25 RX ADMIN — LITHIUM CARBONATE 300 MILLIGRAM(S): 150 CAPSULE ORAL at 06:13

## 2024-09-25 RX ADMIN — ACETAMINOPHEN 650 MILLIGRAM(S): 325 TABLET ORAL at 20:45

## 2024-09-25 RX ADMIN — Medication 875 MILLIGRAM(S): at 06:14

## 2024-09-25 RX ADMIN — OLANZAPINE 5 MILLIGRAM(S): 7.5 TABLET ORAL at 06:02

## 2024-09-25 RX ADMIN — KETOROLAC TROMETHAMINE 15 MILLIGRAM(S): 30 INJECTION, SOLUTION INTRAMUSCULAR at 06:11

## 2024-09-25 RX ADMIN — Medication 1 PATCH: at 19:30

## 2024-09-25 RX ADMIN — Medication 1 PATCH: at 20:05

## 2024-09-25 RX ADMIN — ACETAMINOPHEN 650 MILLIGRAM(S): 325 TABLET ORAL at 19:30

## 2024-09-25 NOTE — CHART NOTE - NSCHARTNOTEFT_GEN_A_CORE
ED SW-    LMSW consulted by ED MD for patient in ED presenting for psych eval. Per chart, patient is a 42 y/o M pmhx of schizoaffective disorder presenting to ED s/p syncopal episode and fell down on his face.  Per chart, patient reporting SI. Per psychiatrist, patient requires 2PC psych admission. Per ED MD, patient medically cleared for psych transfer.    LMSW contacted the follow facilities for bed availability:    [facility]: Per Alonso, no beds as unit is acute  [facility]: Per Nadia, patient too medically complexed  [facility]: Per Franky, requesting patient receives further GI workup, such as CT of abdomen   [facility]: Per Shira, no beds  [facility]: Can fax, referral faxed.  [facility]: Per Michelle, no beds  [facility]: VM left void PHI  [facility]: VM left void PHI    LMSW created psych packet containing legals, EKG, BH eval, face sheet, ED provider note, and transport forms. Psych packet placed on chart. LMSW made ED MD aware of above. Per ED MD, further GI workup not necessary at this time. Psychiatrist made aware of bed search update as well. Hand-off provided to SW colleague. SW to continue bed search.
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
## 2024-09-25 NOTE — ED BEHAVIORAL HEALTH PROGRESS NOTE - NS ED BHA HOMICIDALITY PRESENT CURRENT PLAN
well developed, well nourished , in no acute distress , ambulating without difficulty , normal communication ability None known
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
## 2024-09-25 NOTE — ED BEHAVIORAL HEALTH ASSESSMENT NOTE - CURRENT MEDICATION
Zyprexa 10 mg daily, lithium 450 mg qhs, simvastatin 10 mg daily, losartan 10 mg daily, famotidine 40 mg daily

## 2024-09-25 NOTE — ED BEHAVIORAL HEALTH ASSESSMENT NOTE - NSPRESENTSXS_PSY_ALL_CORE
Psychosis/Impulsivity/Global insomnia/Severe anxiety, agitation or panic/Refusal or inability to complete safety plan

## 2024-09-25 NOTE — PROGRESS NOTE ADULT - SUBJECTIVE AND OBJECTIVE BOX
CC: y/o presents with with CC of pain in for past days    HPI: reports patient has been having pain for past     Med HX:MEWS Score    Esophageal ulcer    Bipolar disorder    Bipolar disorder, curr episode mixed, severe, with psychotic features    Bipolar disorder, curr episode mixed, severe, with psychotic features    S/P endoscopy    PSYCH EVAL SI    25    SysAdmin_VisitLink        RX:acetaminophen     Tablet .. 650 milliGRAM(s) Oral every 6 hours PRN  amoxicillin 875 milliGRAM(s) Oral two times a day  atorvastatin 10 milliGRAM(s) Oral at bedtime  famotidine    Tablet 20 milliGRAM(s) Oral two times a day  lithium 450 milliGRAM(s) Oral Once  Albuterol (Eqv-ProAir HFA) 90 mcg/inh inhalation aerosol: 2 puff(s) inhaled every 4 hours as needed for  cough MDD: 6  doxylamine 25 mg oral tablet: 1 tab(s) orally once a day (at bedtime), As Needed - for insomnia  lithium 300 mg oral capsule: 600 milligram(s) orally once a day (in the morning)  lithium 300 mg oral capsule: 900 milligram(s) orally once a day (at bedtime)  nicotine 21 mg/24 hr transdermal film, extended release: 1 patch transdermal once a day  Protonix 40 mg oral delayed release tablet: 1 tab(s) orally once a day  Zofran 4 mg oral tablet: 1 tab(s) orally every 8 hours, As Needed - for nausea  ZyPREXA 20 mg oral tablet: 1 tab(s) orally once a day (at bedtime)      Social Hx: non-contributory    EOE: (+) swelling  TMJ (WNL)  Trismus (-)  LAD (-)    Dysphagia (-)    IOE: (+) swelling (+) palpation (+) mobility  Hard/Soft palate (WNL)  Tongue/Floor of Mouth (WNL)  Buccal Mucosa (WNL)  Percussion (-)    Radiographs: Pa / PAN    Assessment: Gross caries tooth # with associated swelling    Treatment: Discussed clinical and radiographic findings. Written and verbal consent obtained. Protective stabalization. Applied 20% benzocaine. BB. Administered carpules 4% septocaine 1:100k epi via local infiltration. Throat drape placed. Extracted # with elevators and forceps atraumatically. Periosteal elevator used to dissect periosteum in buccal vestibule. Irrigated with sterile saline. Gauze hemostasis achieved. POIG including lip biting precautions. All questions answered.    Bx: F    Recommendations:   1. Soft diet. OTC pain meds as needed.  2. Comprehensive dental care with outpatient private pediatric dentist.  3. If any difficulty breathing/swallowing or fever and swelling occur, return to ED.    Dayanara Miles DDS #[id number] CC: y/o presents with with CC of pain in for past days        Med HX:MEWS Score    Esophageal ulcer    Bipolar disorder    Bipolar disorder, curr episode mixed, severe, with psychotic features    Bipolar disorder, curr episode mixed, severe, with psychotic features    S/P endoscopy    PSYCH EVAL SI    25    SysAdmin_VisitLink        RX:acetaminophen     Tablet .. 650 milliGRAM(s) Oral every 6 hours PRN  amoxicillin 875 milliGRAM(s) Oral two times a day  atorvastatin 10 milliGRAM(s) Oral at bedtime  famotidine    Tablet 20 milliGRAM(s) Oral two times a day  lithium 450 milliGRAM(s) Oral Once  Albuterol (Eqv-ProAir HFA) 90 mcg/inh inhalation aerosol: 2 puff(s) inhaled every 4 hours as needed for  cough MDD: 6  doxylamine 25 mg oral tablet: 1 tab(s) orally once a day (at bedtime), As Needed - for insomnia  lithium 300 mg oral capsule: 600 milligram(s) orally once a day (in the morning)  lithium 300 mg oral capsule: 900 milligram(s) orally once a day (at bedtime)  nicotine 21 mg/24 hr transdermal film, extended release: 1 patch transdermal once a day  Protonix 40 mg oral delayed release tablet: 1 tab(s) orally once a day  Zofran 4 mg oral tablet: 1 tab(s) orally every 8 hours, As Needed - for nausea  ZyPREXA 20 mg oral tablet: 1 tab(s) orally once a day (at bedtime)      Social Hx: non-contributory    EOE: (+) swelling  TMJ (WNL)  Trismus (-)  LAD (-)    Dysphagia (-)    IOE: (+) swelling (+) palpation (+) mobility  Hard/Soft palate (WNL)  Tongue/Floor of Mouth (WNL)  Buccal Mucosa (WNL)  Percussion (-)    Radiographs: Pa / PAN    Assessment: Gross caries tooth # with associated swelling    Treatment: Discussed clinical and radiographic findings. Written and verbal consent obtained. Protective stabalization. Applied 20% benzocaine. BB. Administered carpules 4% septocaine 1:100k epi via local infiltration. Throat drape placed. Extracted # with elevators and forceps atraumatically. Periosteal elevator used to dissect periosteum in buccal vestibule. Irrigated with sterile saline. Gauze hemostasis achieved. POIG including lip biting precautions. All questions answered.    Bx: F    Recommendations:   1. Soft diet. OTC pain meds as needed.  2. Comprehensive dental care with outpatient private pediatric dentist.  3. If any difficulty breathing/swallowing or fever and swelling occur, return to ED.    Dayanara Miles DDS #[id number] CC: 42 y/o presents with CC of teeth falling out when falling flat down    HPI Objective Statement: 42 y/o male bib ems after his mother called 911, per ems pt was found on the floor face down, arousable, but ems lifted him up  to be in the stretcher, pt. w/  hx of schizoaffective d/o . unable to assess pt's current suicidality or homicidality. Mom who attended patient stated that couple of teeth came out when he fell but were unable to locate the teeth on the floor. Patient brought into the ED with EMS without teeth.     Esophageal ulcer    Bipolar disorder    Bipolar disorder, curr episode mixed, severe, with psychotic features    Bipolar disorder, curr episode mixed, severe, with psychotic features    S/P endoscopy    PSYCH EVAL SI    25    SysAdmin_VisitLink    RX:acetaminophen     Tablet .. 650 milliGRAM(s) Oral every 6 hours PRN  amoxicillin 875 milliGRAM(s) Oral two times a day  atorvastatin 10 milliGRAM(s) Oral at bedtime  famotidine    Tablet 20 milliGRAM(s) Oral two times a day  lithium 450 milliGRAM(s) Oral Once  Albuterol (Eqv-ProAir HFA) 90 mcg/inh inhalation aerosol: 2 puff(s) inhaled every 4 hours as needed for  cough MDD: 6  doxylamine 25 mg oral tablet: 1 tab(s) orally once a day (at bedtime), As Needed - for insomnia  lithium 300 mg oral capsule: 600 milligram(s) orally once a day (in the morning)  lithium 300 mg oral capsule: 900 milligram(s) orally once a day (at bedtime)  nicotine 21 mg/24 hr transdermal film, extended release: 1 patch transdermal once a day  Protonix 40 mg oral delayed release tablet: 1 tab(s) orally once a day  Zofran 4 mg oral tablet: 1 tab(s) orally every 8 hours, As Needed - for nausea  ZyPREXA 20 mg oral tablet: 1 tab(s) orally once a day (at bedtime)      Social Hx: Patient lives with his mother and father.     EOE:   (+) swelling upper lip  Abrasions (+) upper right eyelid, upper lip  TMJ (WNL)  Trismus (-)  LAD (-)  Dysphagia (-)    IOE: Missing #7,8,9. #10 is hypermobile and an aspiration risk to patient.  (+) swelling: maxillary anterior gingiva   (+) palpation : maxillary anterior gingiva   (+) mobility: hypermobility tooth #10, grade 4  Hard/Soft palate (WNL)  Tongue/Floor of Mouth (WNL)  Buccal Mucosa (WNL)    Radiographs: CT scan. Patient was not able to have radiograph taken due to behavior    CT scan: interpreted by OMFS prior to arrival.   Interpretation: Fracture is isolated to alveolar bone and maxillary is intact    Assessment: Avulsion of #7,8,9. #10 has grade 4 mobility and is an aspiration risk. Lingual maxillary anterior plate fracture.    Treatment: Discussed clinical and Ct findings with patient. Explained to patient that tooth #10 is an aspiration risk and is a danger to the patient to leave it alone. Patient was agitated, hyperventilating, and anxious. Explained to patient that tooth can be removed by finger extraction. Patient was then screaming and even more agitated. Patient stated that they need anesthesia to remove tooth. Written and verbal consent obtained. Applied 20% benzocaine for 5 minutes. Throat pack placed. Extracted #10 with forceps atraumatically, as patient did not want me to use my fingers with guaze. Gauze hemostasis achieved. Amoxicillin prescribed to patient. POIG. All questions answered.    Bx: F2. Patient was screaming and super agitated but recovered well.    Recommendations:   1. Soft diet. OTC pain meds as needed.  2. Take antibiotics to full course, per ED  2. Comprehensive dental care with outpatient private pediatric dentist.  3. If any difficulty breathing/swallowing or fever and swelling occur, return to ED.      Nahomy Coffman DDS #[id number] CC: 42 y/o presents with CC of teeth falling out when falling flat down    HPI Objective Statement: 42 y/o male bib ems after his mother called 911, per ems pt was found on the floor face down, arousable, but ems lifted him up  to be in the stretcher, pt. w/  hx of schizoaffective d/o . unable to assess pt's current suicidality or homicidality. Mom who attended patient stated that couple of teeth came out when he fell but were unable to locate the teeth on the floor. Patient brought into the ED with EMS without teeth.     Esophageal ulcer    Bipolar disorder    Bipolar disorder, curr episode mixed, severe, with psychotic features    Bipolar disorder, curr episode mixed, severe, with psychotic features    S/P endoscopy    PSYCH EVAL SI    25    SysAdmin_VisitLink    RX:acetaminophen     Tablet .. 650 milliGRAM(s) Oral every 6 hours PRN  amoxicillin 875 milliGRAM(s) Oral two times a day  atorvastatin 10 milliGRAM(s) Oral at bedtime  famotidine    Tablet 20 milliGRAM(s) Oral two times a day  lithium 450 milliGRAM(s) Oral Once  Albuterol (Eqv-ProAir HFA) 90 mcg/inh inhalation aerosol: 2 puff(s) inhaled every 4 hours as needed for  cough MDD: 6  doxylamine 25 mg oral tablet: 1 tab(s) orally once a day (at bedtime), As Needed - for insomnia  lithium 300 mg oral capsule: 600 milligram(s) orally once a day (in the morning)  lithium 300 mg oral capsule: 900 milligram(s) orally once a day (at bedtime)  nicotine 21 mg/24 hr transdermal film, extended release: 1 patch transdermal once a day  Protonix 40 mg oral delayed release tablet: 1 tab(s) orally once a day  Zofran 4 mg oral tablet: 1 tab(s) orally every 8 hours, As Needed - for nausea  ZyPREXA 20 mg oral tablet: 1 tab(s) orally once a day (at bedtime)      Social Hx: Patient lives with his mother and father.     EOE:   (+) swelling upper lip  Abrasions (+) upper right eyelid, upper lip  TMJ (WNL)  Trismus (-)  LAD (-)  Dysphagia (-)    IOE: Missing #7,8,9. #10 is hypermobile and an aspiration risk to patient.  (+) swelling: maxillary anterior gingiva   (+) palpation : maxillary anterior gingiva   (+) mobility: hypermobility tooth #10, grade 4  Hard/Soft palate (WNL)  Tongue/Floor of Mouth (WNL)  Buccal Mucosa (WNL)    Radiographs: CT scan. Patient was not able to have PA radiograph taken due to behavior    CT scan: interpreted by OMFS prior to arrival.   Interpretation: Acute fracture through the anterior maxilla, involving the anterior bony walls of the sockets of the bilateral medial and lateral incisors, and the posterior walls of the sockets of the right medial and lateral incisors. The left lateral incisor is displaced anteriorly 4 mm out of its socket. The left medial incisor and both right incisors are absent with well defined visible sockets compatible with recent traumatic tooth loss.    Assessment: Avulsion of #7,8,9. #10 has grade 4 mobility and is an aspiration risk. Lingual maxillary anterior plate fracture.    Treatment: Discussed clinical and Ct findings with patient. Explained to patient that tooth #10 is an aspiration risk and is a danger to the patient to leave it alone. Patient was agitated, hyperventilating, and anxious. Explained to patient that tooth can be removed by finger extraction. Patient was then screaming and even more agitated. Patient stated that they need anesthesia to remove tooth. Written and verbal consent obtained. Applied 20% benzocaine for 5 minutes. Throat pack placed. Extracted #10 with forceps atraumatically, as patient did not want me to use my fingers with guaze. Gauze hemostasis achieved. Amoxicillin prescribed to patient. POIG. All questions answered.    Bx: F2. Patient was screaming and super agitated but recovered well.    Recommendations:   1. Soft diet. OTC pain meds as needed.  2. Take antibiotics to full course, per ED  2. Comprehensive dental care with outpatient private pediatric dentist.  3. If any difficulty breathing/swallowing or fever and swelling occur, return to ED.      Nahomy Coffman DDS #[id number]

## 2024-09-25 NOTE — ED ADULT NURSE REASSESSMENT NOTE - DESCRIPTION
Pt refused Lithium 450 mg po and insisted that he already took the medication today. Pt denied suicidal ideations but pt is guarded and paranoid. Pt reiuested to be watched by a   because  he said someone might give him insulin. Pt also asked if the hospital has services for terminally ill people and is he can get help getting all his affairs together. RB asked which specific affairs he pertained to and he said he is broke and does not have money but wants to know if there was such a service because he cannot explain it because its personal.
Pt awake, requested to speak to ED attending
Pt remained on CO for safety. Pt has been cooperative however he is very concerned regarding his appearance because of his injury. Pt remains in CC26. Pt was able to tolerate soft diet. No c/o pain and no bleeding from mouth noted.

## 2024-09-25 NOTE — ED BEHAVIORAL HEALTH ASSESSMENT NOTE - PSYCHIATRIC ISSUES AND PLAN (INCLUDE STANDING AND PRN MEDICATION)
resume home meds, starting at lithium 300 mg daily, Zyprexa 5 mg daily, uptitrate as tolerated. PRNs for agitation: Zyprexa 5 mg PO/IM q8h (total max dose 20 mg/day)

## 2024-09-25 NOTE — ED BEHAVIORAL HEALTH ASSESSMENT NOTE - RISK ASSESSMENT
risk factors include unclear hx prior suicide attempts; impulse control disorder, ongoing mood and psychotic sx, suicidal and violent ideation/statements, medication non-adherence, hx substance use

## 2024-09-25 NOTE — ED BEHAVIORAL HEALTH PROGRESS NOTE - PSYCHIATRIC ISSUES AND PLAN (INCLUDE STANDING AND PRN MEDICATION)
Ivone w/ psychosis. Patient previously on Zyprexa 20 mg nightly and Lithium 600 mg daily/900 mg nightly. Ivone w/ psychosis. c/w Zyprexa and Lithium

## 2024-09-25 NOTE — ED BEHAVIORAL HEALTH PROGRESS NOTE - MEDICAL ISSUES AND PLAN (INCLUDE STANDING AND PRN MEDICATION)
10th tooth removed. Dental recommends pt drink through a straw and avoid anything hot, cold, or spicy. Recommend soft food diet for 1 week for concern of dislodging clot. Amoxicillin 875 mg daily for 7 days (last dose on October 2nd) as per ED physician

## 2024-09-25 NOTE — ED BEHAVIORAL HEALTH ASSESSMENT NOTE - ADDITIONAL DETAILS ALL
no hx per mother or chart review, pt unable to answer questions, pressured and preoccupied with daughter's safety

## 2024-09-25 NOTE — ED BEHAVIORAL HEALTH ASSESSMENT NOTE - SUMMARY
43 year old male, domiciled with parents, unemployed/on SSDI, with PMH HTN, HLD, gastric ulcer, PPH bipolar 1 disorder, hx cannabis and nicotine use, multiple psychiatric hospitalizations (recently admitted at [facility] in [facility] from 9/9/24-9/16/24 for impulsive behavior, attempting to contact FBI), no known hx SA/SIB/violence, minor legal hx (theft), no current outpatient care, BIBEMS a/b mother and pt after pt had serious fall, resulting in head fracture and missing teeth (per CTH), pt had also reported suicidal thoughts after the fall, mother concerned regarding ongoing paranoid ideation and erratic mood. On eval, pt preoccupied with belief that daughter is under threat from his ex-girlfriend, describes long-term med non-adherence, unable to assess further as pt increasingly agitated throughout interview. Collateral from mother concerning for recent impulsive behavior (pt drove to [facility] to speak with FBI), more depressed mood over past two days, suicidal statement, unclear circumstances today of pt's significant injury (fall resulting in head fracture, lost several teeth). MSE notable for pressured speech, disorganized/illogical thought process, persecutory, grandiose, and paranoid delusions. Impression is bipolar 1 disorder, current mixed episode with psychotic features. Pt is not psychiatrically cleared and requires ongoing psychiatric stabilization. Per ED team will plan for medical admission with [facility] psychiatry to follow.

## 2024-09-25 NOTE — ED BEHAVIORAL HEALTH ASSESSMENT NOTE - DESCRIPTION
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

## 2024-09-25 NOTE — ED BEHAVIORAL HEALTH ASSESSMENT NOTE - OTHER PAST PSYCHIATRIC HISTORY (INCLUDE DETAILS REGARDING ONSET, COURSE OF ILLNESS, INPATIENT/OUTPATIENT TREATMENT)
Multiple prior hospitalizations, recent discharge from inpatient psychiatric unit in [facility] two weeks ago after presentation for impulsive/bizarre behavior (attempted to contact FBI). Prior hx on ACT team, was discharged d/t treatment non-compliance. Denies current outpatient treatment

## 2024-09-26 VITALS
RESPIRATION RATE: 18 BRPM | SYSTOLIC BLOOD PRESSURE: 143 MMHG | OXYGEN SATURATION: 99 % | HEART RATE: 71 BPM | TEMPERATURE: 99 F | DIASTOLIC BLOOD PRESSURE: 94 MMHG

## 2024-09-26 PROCEDURE — 71045 X-RAY EXAM CHEST 1 VIEW: CPT

## 2024-09-26 PROCEDURE — 70450 CT HEAD/BRAIN W/O DYE: CPT | Mod: MC

## 2024-09-26 PROCEDURE — 70486 CT MAXILLOFACIAL W/O DYE: CPT | Mod: MC

## 2024-09-26 PROCEDURE — 99285 EMERGENCY DEPT VISIT HI MDM: CPT | Mod: 25

## 2024-09-26 PROCEDURE — 85025 COMPLETE CBC W/AUTO DIFF WBC: CPT

## 2024-09-26 PROCEDURE — 80178 ASSAY OF LITHIUM: CPT

## 2024-09-26 PROCEDURE — 80053 COMPREHEN METABOLIC PANEL: CPT

## 2024-09-26 PROCEDURE — 93005 ELECTROCARDIOGRAM TRACING: CPT

## 2024-09-26 PROCEDURE — 87637 SARSCOV2&INF A&B&RSV AMP PRB: CPT

## 2024-09-26 PROCEDURE — 96375 TX/PRO/DX INJ NEW DRUG ADDON: CPT

## 2024-09-26 PROCEDURE — 80307 DRUG TEST PRSMV CHEM ANLYZR: CPT

## 2024-09-26 PROCEDURE — 84484 ASSAY OF TROPONIN QUANT: CPT

## 2024-09-26 PROCEDURE — 99214 OFFICE O/P EST MOD 30 MIN: CPT | Mod: GC

## 2024-09-26 PROCEDURE — 96374 THER/PROPH/DIAG INJ IV PUSH: CPT

## 2024-09-26 PROCEDURE — 81003 URINALYSIS AUTO W/O SCOPE: CPT

## 2024-09-26 PROCEDURE — 76377 3D RENDER W/INTRP POSTPROCES: CPT

## 2024-09-26 PROCEDURE — 84443 ASSAY THYROID STIM HORMONE: CPT

## 2024-09-26 PROCEDURE — D7140: CPT

## 2024-09-26 RX ADMIN — FAMOTIDINE 20 MILLIGRAM(S): 10 INJECTION INTRAVENOUS at 06:01

## 2024-09-26 RX ADMIN — ACETAMINOPHEN 650 MILLIGRAM(S): 325 TABLET ORAL at 01:48

## 2024-09-26 RX ADMIN — Medication 1 PATCH: at 07:31

## 2024-09-26 RX ADMIN — Medication 1 PATCH: at 13:22

## 2024-09-26 RX ADMIN — Medication 875 MILLIGRAM(S): at 06:02

## 2024-09-26 RX ADMIN — Medication 1 PATCH: at 13:18

## 2024-09-26 NOTE — ED ADULT NURSE REASSESSMENT NOTE - NS ED NURSE REASSESS COMMENT FT1
report given to [place], report given to EMS, belongings(clothes) secured with EMS. Pt transferred in stable condition

## 2024-09-26 NOTE — ED BEHAVIORAL HEALTH PROGRESS NOTE - NSBHMSETHTCONTENT_PSY_A_CORE
Delusions/Ideas of reference/Obsessions/Preoccupations
Delusions/Ideas of reference/Obsessions/Preoccupations

## 2024-09-26 NOTE — ED BEHAVIORAL HEALTH PROGRESS NOTE - NSBHMSEAFFRANGE_PSY_A_CORE
angry, fearful, intermittently crying/sobbing through interview/Labile
angry, fearful, intermittently crying/sobbing through interview/Labile

## 2024-09-26 NOTE — ED BEHAVIORAL HEALTH PROGRESS NOTE - DETAILS:
Patient with continued alton and psychosis with extensive delusional content regarding his ex-girlfriend and family engaging in a sex trafficking ring. Denies SI, HI, and AVH.
Patient with continued alton and psychosis with extensive delusional content regarding his ex-girlfriend and family engaging in a sex trafficking ring. Denies SI, HI, and AVH. Patient requesting a cigarette; received 21 mg nicotine patch, but states that he uses 3 or 4 patches at home. Declines offer for gum/lozenges because they give him headaches.

## 2024-09-26 NOTE — ED ADULT NURSE REASSESSMENT NOTE - NS ED NURSE REASSESS COMMENT FT1
received pt this am and introduced self, assisted pt to make phone, and encouraged pt to make needs known. Pt denies pain or discomfort at this time, 1:1 maintained for safety

## 2024-09-26 NOTE — ED BEHAVIORAL HEALTH PROGRESS NOTE - NSBHATTESTCOMMENTATTENDFT_PSY_A_CORE
43 year old male, domiciled with parents, unemployed/on SSDI, with PMH HTN, HLD, gastric ulcer, PPH bipolar 1 disorder, hx cannabis and nicotine use, multiple psychiatric hospitalizations (recently admitted at [facility] in [facility] from 9/9/24-9/16/24 for impulsive behavior, attempting to contact FBI), no known hx SA/SIB/violence, minor legal hx (theft), no current outpatient care, BIBEMS a/b mother and pt after pt had serious fall, resulting in head fracture and missing teeth (per CTH), pt had also reported suicidal thoughts after the fall, mother concerned regarding ongoing paranoid ideation and erratic mood, pt seen for psychosis.  Pt seen overnight by telepsych, this AM pt remains hyperverbal, pressured, irritable, and labile.  Pt without insight into recent behaviors and need for tx; states he does not like to take medication, feels his mother and ex-girlfriend are sex traffickers.  Denies SI/HI or AVH.  9/26: Pt awaiting placement, irritable, makes poor eye contact.  States he is "fine", ate breakfast.  States he is waiting for transfer to inpt psychiatry.  Paranoid regarding writer.  Dx: BAD1.  Recs: c/w Lithium/Zyprexa.  2PC to inpt psych.  Agree with resident's assessment and plan as above.  
43 year old male, domiciled with parents, unemployed/on SSDI, with PMH HTN, HLD, gastric ulcer, PPH bipolar 1 disorder, hx cannabis and nicotine use, multiple psychiatric hospitalizations (recently admitted at [facility] in [facility] from 9/9/24-9/16/24 for impulsive behavior, attempting to contact FBI), no known hx SA/SIB/violence, minor legal hx (theft), no current outpatient care, BIBEMS a/b mother and pt after pt had serious fall, resulting in head fracture and missing teeth (per CTH), pt had also reported suicidal thoughts after the fall, mother concerned regarding ongoing paranoid ideation and erratic mood, pt seen for psychosis.  Pt seen overnight by telepsych, this AM pt remains hyperverbal, pressured, irritable, and labile.  Pt without insight into recent behaviors and need for tx; states he does not like to take medication, feels his mother and ex-girlfriend are sex traffickers.  Denies SI/HI or AVH.  Dx: BAD1.  Recs: c/w Lithium/Zyprexa.  2PC to inpt psych.  Agree with resident's assessment and plan as above.

## 2024-09-26 NOTE — ED BEHAVIORAL HEALTH PROGRESS NOTE - NSICDXBHPRIMARYDX_PSY_ALL_CORE
Bipolar disorder, curr episode mixed, severe, with psychotic features   F31.64  
Bipolar disorder, curr episode mixed, severe, with psychotic features   F31.64

## 2024-09-26 NOTE — ED ADULT NURSE REASSESSMENT NOTE - NS ED NURSE REASSESS COMMENT FT1
pt. is awake most of the shift, but he was calm and in control of his impulses. 1:1 CO for s/i maintained. compliant w/ his am meds.

## 2024-09-26 NOTE — ED BEHAVIORAL HEALTH PROGRESS NOTE - UNABLE TO CARE FOR SELF DETAILS
Patient with acute alton and psychosis with delusions relating to a sex trafficking ring. Patient unable to reality test and is making impulsive decisions about his delusions, including driving to [place] to approach the FBI about his findings.
Patient with acute alton and psychosis with delusions relating to a sex trafficking ring. Patient unable to reality test and is making impulsive decisions about his delusions, including driving to [place] to approach the FBI about his findings.

## 2024-09-26 NOTE — ED ADULT NURSE REASSESSMENT NOTE - NS ED NURSE REASSESS COMMENT FT1
observed sleeping most of the shift ad no further episodes of agitation noted. 1:1 CO for psychosis/elopement maintained.

## 2024-09-26 NOTE — ED BEHAVIORAL HEALTH PROGRESS NOTE - NSBHMSETHTPROC_PSY_A_CORE
Overinclusive/Tangential/Illogical/Impaired reasoning
Overinclusive/Tangential/Illogical/Impaired reasoning

## 2024-09-26 NOTE — ED BEHAVIORAL HEALTH PROGRESS NOTE - RISK ASSESSMENT
risk factors include unclear hx prior suicide attempts; impulse control disorder, ongoing mood and psychotic sx, suicidal and violent ideation/statements, medication non-adherence, hx substance use
risk factors include unclear hx prior suicide attempts; impulse control disorder, ongoing mood and psychotic sx, suicidal and violent ideation/statements, medication non-adherence, hx substance use

## 2024-09-26 NOTE — ED BEHAVIORAL HEALTH PROGRESS NOTE - NSBHMSETHTASSOC_PSY_A_CORE
racing thoughts, some loosening (e.g. pt begins talking about 9/11)/Normal
racing thoughts, some loosening (e.g. pt begins talking about 9/11)/Normal
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
## 2024-12-02 ENCOUNTER — APPOINTMENT (OUTPATIENT)
Dept: INTERNAL MEDICINE | Facility: CLINIC | Age: 44
End: 2024-12-02

## 2025-01-19 ENCOUNTER — EMERGENCY (EMERGENCY)
Facility: HOSPITAL | Age: 45
LOS: 1 days | End: 2025-01-19
Admitting: EMERGENCY MEDICINE
Payer: MEDICARE

## 2025-01-19 VITALS
TEMPERATURE: 98 F | RESPIRATION RATE: 19 BRPM | HEIGHT: 67.5 IN | SYSTOLIC BLOOD PRESSURE: 150 MMHG | WEIGHT: 164.91 LBS | OXYGEN SATURATION: 97 % | DIASTOLIC BLOOD PRESSURE: 97 MMHG | HEART RATE: 87 BPM

## 2025-01-19 PROCEDURE — L9991: CPT

## 2025-01-27 ENCOUNTER — APPOINTMENT (OUTPATIENT)
Dept: INTERNAL MEDICINE | Facility: CLINIC | Age: 45
End: 2025-01-27